# Patient Record
Sex: FEMALE | Race: WHITE | URBAN - METROPOLITAN AREA
[De-identification: names, ages, dates, MRNs, and addresses within clinical notes are randomized per-mention and may not be internally consistent; named-entity substitution may affect disease eponyms.]

---

## 2017-04-13 ENCOUNTER — HOSPITAL ENCOUNTER (EMERGENCY)
Facility: CLINIC | Age: 57
Discharge: HOME | End: 2017-04-14
Attending: EMERGENCY MEDICINE | Admitting: EMERGENCY MEDICINE
Payer: COMMERCIAL

## 2017-04-13 ENCOUNTER — APPOINTMENT (OUTPATIENT)
Dept: CT IMAGING | Facility: CLINIC | Age: 57
End: 2017-04-13
Attending: EMERGENCY MEDICINE
Payer: COMMERCIAL

## 2017-04-13 DIAGNOSIS — A04.72 C. DIFFICILE COLITIS: ICD-10-CM

## 2017-04-13 LAB
ALBUMIN SERPL-MCNC: 2.2 G/DL (ref 3.4–5)
ALP SERPL-CCNC: 99 U/L (ref 40–150)
ALT SERPL W P-5'-P-CCNC: 54 U/L (ref 0–50)
ANION GAP SERPL CALCULATED.3IONS-SCNC: 9 MMOL/L (ref 3–14)
AST SERPL W P-5'-P-CCNC: 27 U/L (ref 0–45)
BILIRUB SERPL-MCNC: 0.5 MG/DL (ref 0.2–1.3)
BUN SERPL-MCNC: 15 MG/DL (ref 7–30)
C DIFF TOX B STL QL: ABNORMAL
CALCIUM SERPL-MCNC: 8.6 MG/DL (ref 8.5–10.1)
CHLORIDE SERPL-SCNC: 101 MMOL/L (ref 94–109)
CO2 BLDCOV-SCNC: 22 MMOL/L (ref 21–28)
CO2 SERPL-SCNC: 24 MMOL/L (ref 20–32)
CREAT SERPL-MCNC: 0.6 MG/DL (ref 0.52–1.04)
ERYTHROCYTE [DISTWIDTH] IN BLOOD BY AUTOMATED COUNT: 13.2 % (ref 10–15)
GFR SERPL CREATININE-BSD FRML MDRD: ABNORMAL ML/MIN/1.7M2
GLUCOSE SERPL-MCNC: 132 MG/DL (ref 70–99)
HCT VFR BLD AUTO: 42 % (ref 35–47)
HGB BLD-MCNC: 13.6 G/DL (ref 11.7–15.7)
LACTATE BLD-SCNC: 0.9 MMOL/L (ref 0.7–2.1)
LACTATE BLD-SCNC: 0.9 MMOL/L (ref 0.7–2.1)
LIPASE SERPL-CCNC: 43 U/L (ref 73–393)
MCH RBC QN AUTO: 30.4 PG (ref 26.5–33)
MCHC RBC AUTO-ENTMCNC: 32.4 G/DL (ref 31.5–36.5)
MCV RBC AUTO: 94 FL (ref 78–100)
PCO2 BLDV: 34 MM HG (ref 40–50)
PH BLDV: 7.42 PH (ref 7.32–7.43)
PLATELET # BLD AUTO: 364 10E9/L (ref 150–450)
PO2 BLDV: 34 MM HG (ref 25–47)
POTASSIUM SERPL-SCNC: 3.3 MMOL/L (ref 3.4–5.3)
PROT SERPL-MCNC: 6 G/DL (ref 6.8–8.8)
RBC # BLD AUTO: 4.48 10E12/L (ref 3.8–5.2)
SAO2 % BLDV FROM PO2: 68 %
SODIUM SERPL-SCNC: 134 MMOL/L (ref 133–144)
SPECIMEN SOURCE: ABNORMAL
WBC # BLD AUTO: 26.1 10E9/L (ref 4–11)

## 2017-04-13 PROCEDURE — 83605 ASSAY OF LACTIC ACID: CPT | Performed by: EMERGENCY MEDICINE

## 2017-04-13 PROCEDURE — 85027 COMPLETE CBC AUTOMATED: CPT | Performed by: EMERGENCY MEDICINE

## 2017-04-13 PROCEDURE — 83605 ASSAY OF LACTIC ACID: CPT | Mod: 91

## 2017-04-13 PROCEDURE — 81001 URINALYSIS AUTO W/SCOPE: CPT | Performed by: EMERGENCY MEDICINE

## 2017-04-13 PROCEDURE — 25500064 ZZH RX 255 OP 636: Performed by: EMERGENCY MEDICINE

## 2017-04-13 PROCEDURE — 99285 EMERGENCY DEPT VISIT HI MDM: CPT | Mod: 25

## 2017-04-13 PROCEDURE — 74177 CT ABD & PELVIS W/CONTRAST: CPT

## 2017-04-13 PROCEDURE — 87506 IADNA-DNA/RNA PROBE TQ 6-11: CPT | Performed by: EMERGENCY MEDICINE

## 2017-04-13 PROCEDURE — 83690 ASSAY OF LIPASE: CPT | Performed by: EMERGENCY MEDICINE

## 2017-04-13 PROCEDURE — 87493 C DIFF AMPLIFIED PROBE: CPT | Mod: XU | Performed by: EMERGENCY MEDICINE

## 2017-04-13 PROCEDURE — 82803 BLOOD GASES ANY COMBINATION: CPT

## 2017-04-13 PROCEDURE — 25000132 ZZH RX MED GY IP 250 OP 250 PS 637: Performed by: EMERGENCY MEDICINE

## 2017-04-13 PROCEDURE — 96374 THER/PROPH/DIAG INJ IV PUSH: CPT | Mod: 59

## 2017-04-13 PROCEDURE — 80053 COMPREHEN METABOLIC PANEL: CPT | Performed by: EMERGENCY MEDICINE

## 2017-04-13 PROCEDURE — 96361 HYDRATE IV INFUSION ADD-ON: CPT

## 2017-04-13 PROCEDURE — 25000128 H RX IP 250 OP 636: Performed by: EMERGENCY MEDICINE

## 2017-04-13 RX ORDER — METRONIDAZOLE 500 MG/1
500 TABLET ORAL 3 TIMES DAILY
Qty: 42 TABLET | Refills: 0 | Status: SHIPPED | OUTPATIENT
Start: 2017-04-13 | End: 2017-04-27

## 2017-04-13 RX ORDER — ONDANSETRON 4 MG/1
4 TABLET, ORALLY DISINTEGRATING ORAL EVERY 8 HOURS PRN
Qty: 10 TABLET | Refills: 0 | Status: SHIPPED | OUTPATIENT
Start: 2017-04-13 | End: 2017-04-16

## 2017-04-13 RX ORDER — METRONIDAZOLE 500 MG/1
500 TABLET ORAL ONCE
Status: COMPLETED | OUTPATIENT
Start: 2017-04-13 | End: 2017-04-13

## 2017-04-13 RX ORDER — OXYCODONE HYDROCHLORIDE 5 MG/1
5 TABLET ORAL EVERY 6 HOURS PRN
Qty: 20 TABLET | Refills: 0 | Status: SHIPPED | OUTPATIENT
Start: 2017-04-13 | End: 2017-04-20

## 2017-04-13 RX ORDER — IOPAMIDOL 755 MG/ML
500 INJECTION, SOLUTION INTRAVASCULAR ONCE
Status: COMPLETED | OUTPATIENT
Start: 2017-04-13 | End: 2017-04-13

## 2017-04-13 RX ORDER — HYDROMORPHONE HYDROCHLORIDE 1 MG/ML
0.5 INJECTION, SOLUTION INTRAMUSCULAR; INTRAVENOUS; SUBCUTANEOUS ONCE
Status: COMPLETED | OUTPATIENT
Start: 2017-04-13 | End: 2017-04-13

## 2017-04-13 RX ADMIN — IOPAMIDOL 78 ML: 755 INJECTION, SOLUTION INTRAVENOUS at 21:55

## 2017-04-13 RX ADMIN — SODIUM CHLORIDE 1000 ML: 9 INJECTION, SOLUTION INTRAVENOUS at 20:59

## 2017-04-13 RX ADMIN — SODIUM CHLORIDE 59 ML: 9 INJECTION, SOLUTION INTRAVENOUS at 21:55

## 2017-04-13 RX ADMIN — HYDROMORPHONE HYDROCHLORIDE 0.5 MG: 1 INJECTION, SOLUTION INTRAMUSCULAR; INTRAVENOUS; SUBCUTANEOUS at 20:59

## 2017-04-13 RX ADMIN — METRONIDAZOLE 500 MG: 500 TABLET ORAL at 23:50

## 2017-04-13 ASSESSMENT — ENCOUNTER SYMPTOMS
DYSURIA: 0
DIARRHEA: 1
HEMATURIA: 0
NAUSEA: 1
FEVER: 0
VOMITING: 0
CHILLS: 1
ABDOMINAL PAIN: 1

## 2017-04-13 NOTE — ED AVS SNAPSHOT
[facility] Emergency Department    [address]    [facility] [address]    Phone:  [phone]    Fax:  [phone]                                       Alize Holman   MRN: [id number]    Department:  [facility] Emergency Department   Date of Visit:  4/13/2017           Patient Information     Date Of Birth          1960        Your diagnoses for this visit were:     C. difficile colitis        You were seen by Christal Xie MD.      Follow-up Information     Follow up with [facility] In 3 days.    Specialties:  Sports Medicine, Pain Management, Obstetrics & Gynecology, Pediatrics, Internal Medicine, Nephrology    Contact information:    [address]  [facility] [address] [phone]        Discharge Instructions       Please follow up closely with your regular physician. Please return to the ED if your symptoms worsen or if you develop new or concerning symptoms.         Clostridium difficile Infection  Clostridium difficile or C. diff bacteria can be very harmful. They affect the intestinal tract. They can cause symptoms ranging from mild diarrhea to severe inflammation of the colon (large intestine). C diff infection is most common during or days to weeks after treatment with antibiotics. Anyone can become infected. But the risk is greatly increased for people in hospitals and for people living in nursing homes or long-term care facilities. This is because antibiotic use is common there. Germs also spread easily in these places.   What causes C. diff Infection?  The stomach and intestines have hundreds of kinds of bacteria. Many of these bacteria actually help keep harmful bacteria like C. diff from causing problems. Small amounts of C. diff are normal and do not cause problems. When a person takes an antibiotic, the normal balance of good and bad bacteria may be affected. There may be too little good  bacteria and too much harmful bacteria like C diff. In hospitals and nursing homes, C. diff may be spread from an infected patient to other patients. This may occur when staff or visitors touch infected patients or objects, such as bed rails, stethoscopes, or bedpans and then touch other patients or surfaces.  What are the symptoms of C. diff infection?  About half of the people with C. diff infection have no symptoms. Yet they can still pass the infection to others. Others do have symptoms. These include:    Watery diarrhea, which may contain mucus    Pain, and cramping    Fever  Some who are infected develop serious problems. Symptoms include:    Abdominal pain    Abdominal swelling    Nausea, and vomiting    Little or no diarrhea  How is C. diff infection diagnosed?  To confirm the infection, a sample of stool is tested for the bacteria or the toxins made by the bacteria.  How is C diff infection treated?  The first step is to stop taking antibiotics. A different medication may prescribed. In certain cases, an antibiotic directed at the C diff infection may be given.    Fluids are often given intravenously (IV) or through a vein. This helps replace fluids lost through diarrhea.    Surgery may be needed if treatment fails to cure severe symptoms     To lessen symptoms:    Drink plenty of fluids to replace water lost through diarrhea. Talk with your health care provider or nurse about which fluids are best.    Follow your health care provider s instructions for when and what to eat.    Unless your health care provider tells you to do so, do not take medications for diarrhea.    Tell your health care provider if symptoms return. Even after treatment, C. difficile may come back.  What are the complications of C. diff infection?  Complications include:    Dehydration    Electrolyte imbalances    Low protein in the blood    Severe dilation or widening of the large intestine    Bowel perforation    Low blood  pressure    Kidney failure    Inflammation or infection throughout the body    Death  How is C. diff prevented?  Hospitals and nursing homes take these steps to help prevent C. diff infections:    Limiting use of antibiotics. Giving antibiotics only when needed can help reduce C. diff infections.    Handwashing. Hospital staff should wash their hands before and after treating each patient. They should also wash their hands after touching any surface in patients' rooms. Soap and water work better than alcohol-based hand .    Protective clothing. Health care workers should wear gloves and a gown when entering the room of a patient with C. diff infection. They should remove these items before leaving and then wash their hands.    Private rooms. People with C. diff should be in private rooms. Or, they may share rooms with others who have the same infection.    Thorough cleaning. Equipment and rooms should be cleaned and disinfected daily.    Education. Patients and visitors should be shown the best ways to avoid infection.  Patients can do the following to help prevent C. diff:    Take antibiotics only when you really need them. Antibiotics don t help treat illnesses caused by viruses. This includes colds and the flu. Don t ask for antibiotics from your health care provider if he or she says they won t work.    When you are given antibiotics, take them as directed. Don t increase or decrease the dosage. Do not take them for shorter or longer than your provider tells you to, even if you feel better.    Wash your hands carefully. Do this after using the bathroom and before eating. Use plenty of soap and warm water. Alcohol-based hand  may not work against C. diff germs.  Everyone can help prevent C. diff with the following:    In a hospital or care facility:    Wash your hands well before and after visiting someone who has C. diff infection. Use soap and water. Alcohol-based hand  may not work  against C. diff.    If the staff asks you to, wear gloves. Take any other steps you are asked to follow to help prevent infection.    At home:    Wear gloves when caring for a family member with C. diff infection. Throw the gloves away after each use. Then wash your hands well.    Wash the patient s clothes, bed linen, and towels separately. Use hot water. Use both detergent and liquid bleach.    Disinfect surfaces in the patient s room. This includes the phone, light switches, and remote controls.    Practice good handwashing    Use warm water and plenty of soap. Rub your hands together well.    Clean the whole hand. Wash under nails, between fingers, and up the wrists.    Wash for at least 15 seconds to 20 seconds.     Rinse. Let the water run down your fingers, not up your wrists.    Dry your hands well. Then use a paper towel to turn off the faucet and open the door.    8900-7786 [employer]. [address] [place] [address], [place] [address]. All rights reserved. This information is not intended as a substitute for professional medical care. Always follow your healthcare professional's instructions.          24 Hour Appointment Hotline       To make an appointment at any [facility], call [phone] ([phone]). If you don't have a family doctor or clinic, we will help you find one. [facility] are conveniently located to serve the needs of you and your family.             Review of your medicines      START taking        Dose / Directions Last dose taken    metroNIDAZOLE 500 MG tablet   Commonly known as:  FLAGYL   Dose:  500 mg   Quantity:  42 tablet        Take 1 tablet (500 mg) by mouth 3 times daily for 14 days   Refills:  0        ondansetron 4 MG ODT tab   Commonly known as:  ZOFRAN ODT   Dose:  4 mg   Quantity:  10 tablet        Take 1 tablet (4 mg) by mouth every 8 hours as needed   Refills:  0        oxyCODONE 5 MG IR tablet   Commonly known as:  ROXICODONE   Dose:  5 mg    Quantity:  20 tablet        Take 1 tablet (5 mg) by mouth every 6 hours as needed for pain   Refills:  0          Our records show that you are taking the medicines listed below. If these are incorrect, please call your family doctor or clinic.        Dose / Directions Last dose taken    CEPHALEXIN PO        Refills:  0        ESCITALOPRAM OXALATE PO        Refills:  0        RITALIN PO        Refills:  0                Prescriptions were sent or printed at these locations (3 Prescriptions)                   Other Prescriptions                Printed at Department/Unit printer (3 of 3)         metroNIDAZOLE (FLAGYL) 500 MG tablet               oxyCODONE (ROXICODONE) 5 MG IR tablet               ondansetron (ZOFRAN ODT) 4 MG ODT tab                Procedures and tests performed during your visit     CBC (platelets, no diff)    CT Abdomen Pelvis w Contrast    Clostridium difficile toxin B PCR    Comprehensive metabolic panel    Enteric Bacteria and Virus Panel by [facility] Stool    ISTAT gases lactate mine POCT    Lactic acid whole blood    Lipase    UA with Microscopic      Orders Needing Specimen Collection     None      Pending Results     Date and Time Order Name Status Description    4/13/2017 2020 Enteric Bacteria and Virus Panel by [facility] Stool In process             Pending Culture Results     Date and Time Order Name Status Description    4/13/2017 2020 Enteric Bacteria and Virus Panel by [facility] Stool In process             Test Results From Your Hospital Stay        4/13/2017  8:46 PM      Component Results     Component Value Ref Range & Units Status    WBC 26.1 (H) 4.0 - 11.0 10e9/L Final    RBC Count 4.48 3.8 - 5.2 10e12/L Final    Hemoglobin 13.6 11.7 - 15.7 g/dL Final    Hematocrit 42.0 35.0 - 47.0 % Final    MCV 94 78 - 100 fl Final    MCH 30.4 26.5 - 33.0 pg Final    MCHC 32.4 31.5 - 36.5 g/dL Final    RDW 13.2 10.0 - 15.0 % Final    Platelet Count 364 150 - 450 10e9/L Final         4/13/2017  9:05 PM       Component Results     Component Value Ref Range & Units Status    Sodium 134 133 - 144 mmol/L Final    Potassium 3.3 (L) 3.4 - 5.3 mmol/L Final    Chloride 101 94 - 109 mmol/L Final    Carbon Dioxide 24 20 - 32 mmol/L Final    Anion Gap 9 3 - 14 mmol/L Final    Glucose 132 (H) 70 - 99 mg/dL Final    Urea Nitrogen 15 7 - 30 mg/dL Final    Creatinine 0.60 0.52 - 1.04 mg/dL Final    GFR Estimate >90  Non  GFR Calc   >60 mL/min/1.7m2 Final    GFR Estimate If Black >90   GFR Calc   >60 mL/min/1.7m2 Final    Calcium 8.6 8.5 - 10.1 mg/dL Final    Bilirubin Total 0.5 0.2 - 1.3 mg/dL Final    Albumin 2.2 (L) 3.4 - 5.0 g/dL Final    Protein Total 6.0 (L) 6.8 - 8.8 g/dL Final    Alkaline Phosphatase 99 40 - 150 U/L Final    ALT 54 (H) 0 - 50 U/L Final    AST 27 0 - 45 U/L Final         4/13/2017  9:05 PM      Component Results     Component Value Ref Range & Units Status    Lipase 43 (L) 73 - 393 U/L Final         4/14/2017 12:13 AM      Component Results     Component Value Ref Range & Units Status    Color Urine Yellow  Final    Appearance Urine Clear  Final    Glucose Urine Negative NEG mg/dL Final    Bilirubin Urine Negative NEG Final    Ketones Urine Negative NEG mg/dL Final    Specific Gravity Urine 1.030 1.003 - 1.035 Final    Blood Urine Small (A) NEG Final    pH Urine 6.0 5.0 - 7.0 pH Final    Protein Albumin Urine Negative NEG mg/dL Final    Urobilinogen mg/dL 0.0 0.0 - 2.0 mg/dL Final    Nitrite Urine Negative NEG Final    Leukocyte Esterase Urine Negative NEG Final    Source Midstream Urine  Final    WBC Urine 2 0 - 2 /HPF Final    RBC Urine <1 0 - 2 /HPF Final    Squamous Epithelial /HPF Urine <1 0 - 1 /HPF Final         4/13/2017 10:46 PM      Component Results     Component Value Ref Range & Units Status    Specimen Description Feces  Final    C Diff Toxin B PCR  NEG Final    Positive  Positive: Toxin producing Clostridium difficile target DNA sequences detected,   presumed  positive for Clostridium difficile toxin B.   Clostridium difficile (Requires Enteric Isolation)   FDA approved assay performed using "[employer] GeneXpert real-time PCR.   Critical Value/Significant Value called to and read back by  Dr. Stanton at 2245 on [id number].[id number]   (A)         4/13/2017  8:39 PM         4/13/2017  8:42 PM      Component Results     Component Value Ref Range & Units Status    Lactic Acid 0.9 0.7 - 2.1 mmol/L Final         4/13/2017 11:10 PM      Narrative     CT ABDOMEN PELVIS WITH CONTRAST 4/13/2017 10:08 PM    HISTORY: Diffuse lower abdominal pain, diarrhea, chills and nausea.    TECHNIQUE: Helical axial scans from dome of liver through pubic  symphysis with 78 mL Isovue-370 IV contrast. Radiation dose for this  scan was reduced using automated exposure control, adjustment of the  mA and/or kV according to patient size, or iterative reconstruction  technique.    COMPARISON: None.    FINDINGS: The liver appears normal. There is a 1 cm focus of increased  density in the neck region of the gallbladder which could represent a  gallstone (image 27 of series 2). If clinically indicated, ultrasound  would be more definitive. The spleen, pancreas, bilateral adrenal  glands and kidneys bilaterally are unremarkable. There is mild diffuse  thickening and contrast-enhancement of the colonic wall throughout  most of the colon, most prominent in the descending and sigmoid colon.  This most likely represents diffuse colitis. Remainder of the bowel  and mesentery in the upper abdomen are unremarkable. Mild vascular  calcifications are seen.    Scans through the pelvis show no additional acute abnormalities. No  uterus is seen consistent with prior hysterectomy or marked uterine  atrophy. There is no free fluid. There is a retrocecal appendix with  no evidence for appendicitis.        Impression     IMPRESSION:  1. Mild diffuse thickening of the wall throughout most of the colon  consistent with colitis.  2.  Possible gallstone.  3. Vascular calcifications.       NEIL CORCORAN MD               4/13/2017  8:36 PM      Component Results     Component Value Ref Range & Units Status    Ph Venous 7.42 7.32 - 7.43 pH Final    PCO2 Venous 34 (L) 40 - 50 mm Hg Final    PO2 Venous 34 25 - 47 mm Hg Final    Bicarbonate Venous 22 21 - 28 mmol/L Final    O2 Sat Venous 68 % Final    Lactic Acid 0.9 0.7 - 2.1 mmol/L Final                Clinical Quality Measure: Blood Pressure Screening     Your blood pressure was checked while you were in the emergency department today. The last reading we obtained was  BP: 111/74 . Please read the guidelines below about what these numbers mean and what you should do about them.  If your systolic blood pressure (the top number) is less than 120 and your diastolic blood pressure (the bottom number) is less than 80, then your blood pressure is normal. There is nothing more that you need to do about it.  If your systolic blood pressure (the top number) is 120-139 or your diastolic blood pressure (the bottom number) is 80-89, your blood pressure may be higher than it should be. You should have your blood pressure rechecked within a year by a primary care provider.  If your systolic blood pressure (the top number) is 140 or greater or your diastolic blood pressure (the bottom number) is 90 or greater, you may have high blood pressure. High blood pressure is treatable, but if left untreated over time it can put you at risk for heart attack, stroke, or kidney failure. You should have your blood pressure rechecked by a primary care provider within the next 4 weeks.  If your provider in the emergency department today gave you specific instructions to follow-up with your doctor or provider even sooner than that, you should follow that instruction and not wait for up to 4 weeks for your follow-up visit.        Thank you for choosing [facility]       Thank you for choosing [facility] for your care. Our goal is  "always to provide you with excellent care. Hearing back from our patients is one way we can continue to improve our services. Please take a few minutes to complete the written survey that you may receive in the mail after you visit with us. Thank you!        [facility] Information     Differential lets you send messages to your doctor, view your test results, renew your prescriptions, schedule appointments and more. To sign up, go to [email or web address] . Click on \"Log in\" on the left side of the screen, which will take you to the Welcome page. Then click on \"Sign up Now\" on the right side of the page.     You will be asked to enter the access code listed below, as well as some personal information. Please follow the directions to create your username and password.     Your access code is: [email or web address]  Expires: 2017 12:24 AM     Your access code will  in 90 days. If you need help or a new code, please call your [facility] clinic or [phone].        Care EveryWhere ID     This is your Care EveryWhere ID. This could be used by other organizations to access your [facility] medical records  [id number]        After Visit Summary       This is your record. Keep this with you and show to your community pharmacist(s) and doctor(s) at your next visit.                  "

## 2017-04-13 NOTE — ED AVS SNAPSHOT
[facility] Emergency Department    [address]    [facility] [address]    Phone:  [phone]    Fax:  [phone]                                       Alize Holman   MRN: [id number]    Department:  [facility] Emergency Department   Date of Visit:  4/13/2017           After Visit Summary Signature Page     I have received my discharge instructions, and my questions have been answered. I have discussed any challenges I see with this plan with the nurse or doctor.    ..........................................................................................................................................  Patient/Patient Representative Signature      ..........................................................................................................................................  Patient Representative Print Name and Relationship to Patient    ..................................................               ................................................  Date                                            Time    ..........................................................................................................................................  Reviewed by Signature/Title    ...................................................              ..............................................  Date                                                            Time

## 2017-04-14 VITALS
TEMPERATURE: 98.7 F | OXYGEN SATURATION: 97 % | SYSTOLIC BLOOD PRESSURE: 111 MMHG | RESPIRATION RATE: 18 BRPM | DIASTOLIC BLOOD PRESSURE: 74 MMHG | WEIGHT: 155 LBS | HEART RATE: 133 BPM

## 2017-04-14 LAB
ALBUMIN UR-MCNC: NEGATIVE MG/DL
APPEARANCE UR: CLEAR
BILIRUB UR QL STRIP: NEGATIVE
CAMPYLOBACTER GROUP BY NAT: NOT DETECTED
COLOR UR AUTO: YELLOW
ENTERIC PATHOGEN COMMENT: NORMAL
GLUCOSE UR STRIP-MCNC: NEGATIVE MG/DL
HGB UR QL STRIP: ABNORMAL
KETONES UR STRIP-MCNC: NEGATIVE MG/DL
LEUKOCYTE ESTERASE UR QL STRIP: NEGATIVE
NITRATE UR QL: NEGATIVE
NOROVIRUS I AND II BY NAT: NOT DETECTED
PH UR STRIP: 6 PH (ref 5–7)
RBC #/AREA URNS AUTO: <1 /HPF (ref 0–2)
ROTAVIRUS A BY NAT: NOT DETECTED
SALMONELLA SPECIES BY NAT: NOT DETECTED
SHIGA TOXIN 1 GENE BY NAT: NOT DETECTED
SHIGA TOXIN 2 GENE BY NAT: NOT DETECTED
SHIGELLA SP+EIEC IPAH STL QL NAA+PROBE: NOT DETECTED
SP GR UR STRIP: 1.03 (ref 1–1.03)
SQUAMOUS #/AREA URNS AUTO: <1 /HPF (ref 0–1)
URN SPEC COLLECT METH UR: ABNORMAL
UROBILINOGEN UR STRIP-MCNC: 0 MG/DL (ref 0–2)
VIBRIO GROUP BY NAT: NOT DETECTED
WBC #/AREA URNS AUTO: 2 /HPF (ref 0–2)
YERSINIA ENTEROCOLITICA BY NAT: NOT DETECTED

## 2017-04-14 NOTE — DISCHARGE INSTRUCTIONS
Please follow up closely with your regular physician. Please return to the ED if your symptoms worsen or if you develop new or concerning symptoms.         Clostridium difficile Infection  Clostridium difficile or C. diff bacteria can be very harmful. They affect the intestinal tract. They can cause symptoms ranging from mild diarrhea to severe inflammation of the colon (large intestine). C diff infection is most common during or days to weeks after treatment with antibiotics. Anyone can become infected. But the risk is greatly increased for people in hospitals and for people living in nursing homes or long-term care facilities. This is because antibiotic use is common there. Germs also spread easily in these places.   What causes C. diff Infection?  The stomach and intestines have hundreds of kinds of bacteria. Many of these bacteria actually help keep harmful bacteria like C. diff from causing problems. Small amounts of C. diff are normal and do not cause problems. When a person takes an antibiotic, the normal balance of good and bad bacteria may be affected. There may be too little good bacteria and too much harmful bacteria like C diff. In hospitals and nursing homes, C. diff may be spread from an infected patient to other patients. This may occur when staff or visitors touch infected patients or objects, such as bed rails, stethoscopes, or bedpans and then touch other patients or surfaces.  What are the symptoms of C. diff infection?  About half of the people with C. diff infection have no symptoms. Yet they can still pass the infection to others. Others do have symptoms. These include:    Watery diarrhea, which may contain mucus    Pain, and cramping    Fever  Some who are infected develop serious problems. Symptoms include:    Abdominal pain    Abdominal swelling    Nausea, and vomiting    Little or no diarrhea  How is C. diff infection diagnosed?  To confirm the infection, a sample of stool is tested for the  bacteria or the toxins made by the bacteria.  How is C diff infection treated?  The first step is to stop taking antibiotics. A different medication may prescribed. In certain cases, an antibiotic directed at the C diff infection may be given.    Fluids are often given intravenously (IV) or through a vein. This helps replace fluids lost through diarrhea.    Surgery may be needed if treatment fails to cure severe symptoms     To lessen symptoms:    Drink plenty of fluids to replace water lost through diarrhea. Talk with your health care provider or nurse about which fluids are best.    Follow your health care provider s instructions for when and what to eat.    Unless your health care provider tells you to do so, do not take medications for diarrhea.    Tell your health care provider if symptoms return. Even after treatment, C. difficile may come back.  What are the complications of C. diff infection?  Complications include:    Dehydration    Electrolyte imbalances    Low protein in the blood    Severe dilation or widening of the large intestine    Bowel perforation    Low blood pressure    Kidney failure    Inflammation or infection throughout the body    Death  How is C. diff prevented?  Hospitals and nursing homes take these steps to help prevent C. diff infections:    Limiting use of antibiotics. Giving antibiotics only when needed can help reduce C. diff infections.    Handwashing. Hospital staff should wash their hands before and after treating each patient. They should also wash their hands after touching any surface in patients' rooms. Soap and water work better than alcohol-based hand .    Protective clothing. Health care workers should wear gloves and a gown when entering the room of a patient with C. diff infection. They should remove these items before leaving and then wash their hands.    Private rooms. People with C. diff should be in private rooms. Or, they may share rooms with others who have  the same infection.    Thorough cleaning. Equipment and rooms should be cleaned and disinfected daily.    Education. Patients and visitors should be shown the best ways to avoid infection.  Patients can do the following to help prevent C. diff:    Take antibiotics only when you really need them. Antibiotics don t help treat illnesses caused by viruses. This includes colds and the flu. Don t ask for antibiotics from your health care provider if he or she says they won t work.    When you are given antibiotics, take them as directed. Don t increase or decrease the dosage. Do not take them for shorter or longer than your provider tells you to, even if you feel better.    Wash your hands carefully. Do this after using the bathroom and before eating. Use plenty of soap and warm water. Alcohol-based hand  may not work against C. diff germs.  Everyone can help prevent C. diff with the following:    In a hospital or care facility:    Wash your hands well before and after visiting someone who has C. diff infection. Use soap and water. Alcohol-based hand  may not work against C. diff.    If the staff asks you to, wear gloves. Take any other steps you are asked to follow to help prevent infection.    At home:    Wear gloves when caring for a family member with C. diff infection. Throw the gloves away after each use. Then wash your hands well.    Wash the patient s clothes, bed linen, and towels separately. Use hot water. Use both detergent and liquid bleach.    Disinfect surfaces in the patient s room. This includes the phone, light switches, and remote controls.    Practice good handwashing    Use warm water and plenty of soap. Rub your hands together well.    Clean the whole hand. Wash under nails, between fingers, and up the wrists.    Wash for at least 15 seconds to 20 seconds.     Rinse. Let the water run down your fingers, not up your wrists.    Dry your hands well. Then use a paper towel to turn off the  faucet and open the door.    1400-8760 The "[employer]". [address] [place] [address], [place] [address]. All rights reserved. This information is not intended as a substitute for professional medical care. Always follow your healthcare professional's instructions.

## 2017-04-14 NOTE — ED PROVIDER NOTES
History     Chief Complaint:  Diarrhea    The history is provided by the patient.      Alize Holman is a 56 year old female status recent oral surgery on 4/7 who presents to the ED for evaluation of diarrhea. The patient states that she was initially found to have an oral infection back in February. At that time, she was placed on oral antibiotics for 2 weeks. Two weeks after starting these antibiotics, she began to experience diarrhea. She subsequently stopped taking the antibiotics, but the symptoms did not stop. After her surgery last week, the patient was again placed on antibiotics. She presents today with crampy abdominal pain with associated diffuse, watery diarrhea. She says that she has had 12 episodes of diarrhea today. She denies the presence of blood. In addition, she admits to intermittent chills and nausea. She denies fevers, vomiting, dysuria or hematuria. She denies any recent travel. No other concerns or complaints at this time.     Allergies:  Penicillins     Medications:    Ritalin  Escitalopram  Keflex     Past Medical History:    ADD  Depression    Past Surgical History:    History reviewed. No pertinent past surgical history.    Family History:    History reviewed. No pertinent family history.    Social History:  Marital Status:   Presents to the ED with her daughter     Review of Systems   Constitutional: Positive for chills. Negative for fever.   Gastrointestinal: Positive for abdominal pain, diarrhea and nausea. Negative for vomiting.   Genitourinary: Negative for dysuria and hematuria.   All other systems reviewed and are negative.      Physical Exam   First Vitals:  BP: 105/77  Pulse: 133  Heart Rate: 133  Temp: 98.7  F (37.1  C)  Resp: 18  Weight: 70.3 kg (155 lb)  SpO2: 96 %      Physical Exam  Constitutional: The patient is oriented to person, place, and time. Alert and cooperative.  HENT:   Right Ear: External ear normal.   Left Ear: External ear normal.   Nose: Nose normal.    Mouth/Throat: Uvula is midline, oropharynx is clear and moist and mucous membranes are normal. No posterior oropharyngeal edema or erythema.   Eyes: Conjunctivae, EOM and lids are normal. Pupils are equal, round, and reactive to light.   Neck: Trachea normal. Normal range of motion. Neck supple.   Cardiovascular: tachycardia, regular rhythm, normal heart sounds, and intact distal pulses.    Pulmonary/Chest: Effort normal and breath sounds equal bilaterally. No crackles or wheezing.   Abdominal: Soft. Diffuse lower abdominal tenderness to palpation. No rebound and no guarding.   Musculoskeletal: Normal range of motion.  No extremity tenderness or edema.  Neurological: Alert and Oriented. Strength 5/5 in upper and lower extremities bilaterally. Sensation intact to light touch throughout.  Skin: Skin is dry. No rash noted.          Emergency Department Course     Imaging:    CT Abdomen/pelvis w/Contrast  1. Mild diffuse thickening of the wall throughout most of the colon  consistent with colitis.  2. Possible gallstone.  3. Vascular calcifications.  Report per radiology.    Radiographic findings were communicated with the patient and family who voiced understanding of the findings.    Laboratory:  CBC:  WBC 26.1 (H), HGB 13.6, , otherwise WNL  CMP: K 3.3 (L), Glucose 1332 (H), Albumin 2.2 (L), Total protein 6.0 (L), ALT 54 (H), otherwise WNL (Creatinine 0.60)  Lipase: 43 (L)    (2023) Lactic acid: 0.9    (2032) Blood gas venous POCT: pH 7.42 PCO2 34 (L) PO2 34 Bicarbonate 22 O2 Sat 68 Lactic acid 0.9    UA: Clear yellow urine, small blood, otherwise WNL    C. Diff toxin B PCR: positive  Enteric bacteria & virus panel: pending    Interventions:  (2059) Normal Saline, 1 liter, IV bolus  (2059) Dilaudid, 0.5 mg, IV  (2350) Flagyl, 500 mg, PO    Emergency Department Course:  Nursing notes and vitals reviewed.  I performed an exam of the patient as documented above. GCS 15.    A peripheral IV was established. Blood  was drawn from the patient. This was sent for laboratory testing, findings above. Urine and stool samples were obtained and sent for laboratory analysis, findings above.    The patient was sent for a CT abdomen/pelvis while in the emergency department, findings above.     (2230) I updated the patient on all of the lab and imaging results.    Findings and plan explained to the patient. Patient discharged home with instructions regarding supportive care, medications, and reasons to return. The importance of close follow-up was reviewed. The patient was prescribed flagyl, roxicodone and zofran.    I personally reviewed the laboratory results with the patient and answered all related questions prior to discharge.       Impression & Plan      Medical Decision Making:  Alize Holman is a 56 year old female who presents to the ED for evaluation of diarrhea. Upon presentation to the ED, the patient is nontoxic appearing. She is tachycardic, though her vital signs are otherwise within normal limits and stable. Throughout her ED stay, her heart rate improved. On exam, she is well appearing. She is alert, oriented, and neuro exam is nonfocal. Aside from tachycardia, cardiopulmonary exam is unremarkable. On abdominal examination, she has diffuse lower tenderness with palpation. There is no rebound or guarding. The rest of her exam is as mentioned above. Labs were obtained and are as mentioned above. Notably, she does have a leukocytosis of 26.1. Lactate is within normal limits. CT of the abdomen was obtained and demonstrates mild diffuse thickening of the wall throughout most of the colon consistent with colitis. A C. Difficile and stool cultures were obtained. The C. Difficile did return positive. Given this patient's history and presentation, I do feel that the patient's symptoms are most consistent with a C. Difficile colitis. The patient is well-appearing here, therefore I do feel that she can be discharged to home with  outpatient management of her C. Difficile. I did discuss with the patient that I recommend close follow-up with her PCP and she notes understanding and agreement with this plan. Return instructions were given. She was stable/improved at time of discharge.       Diagnosis:    ICD-10-CM    1. C. difficile colitis A04.7        Disposition:  discharged to home    Discharge Medications:  New Prescriptions    METRONIDAZOLE (FLAGYL) 500 MG TABLET    Take 1 tablet (500 mg) by mouth 3 times daily for 14 days    ONDANSETRON (ZOFRAN ODT) 4 MG ODT TAB    Take 1 tablet (4 mg) by mouth every 8 hours as needed    OXYCODONE (ROXICODONE) 5 MG IR TABLET    Take 1 tablet (5 mg) by mouth every 6 hours as needed for pain       I, Bacilio Paz, am serving as a scribe on 4/13/2017 at 8:03 PM to personally document services performed by Dr. Anne-Marie MD based on my observations and the provider's statements to me.     4/13/2017   [facility] EMERGENCY DEPARTMENT       Christal Xie MD  04/14/17 5799

## 2017-04-20 ENCOUNTER — OFFICE VISIT (OUTPATIENT)
Dept: FAMILY MEDICINE | Facility: CLINIC | Age: 57
End: 2017-04-20
Payer: COMMERCIAL

## 2017-04-20 VITALS
HEIGHT: 67 IN | WEIGHT: 147 LBS | TEMPERATURE: 98.1 F | BODY MASS INDEX: 23.07 KG/M2 | DIASTOLIC BLOOD PRESSURE: 86 MMHG | HEART RATE: 105 BPM | SYSTOLIC BLOOD PRESSURE: 116 MMHG

## 2017-04-20 DIAGNOSIS — Z12.11 SCREEN FOR COLON CANCER: ICD-10-CM

## 2017-04-20 DIAGNOSIS — A04.72 COLITIS DUE TO CLOSTRIDIUM DIFFICILE: Primary | ICD-10-CM

## 2017-04-20 DIAGNOSIS — Z71.89 ADVANCED DIRECTIVES, COUNSELING/DISCUSSION: ICD-10-CM

## 2017-04-20 DIAGNOSIS — F17.200 TOBACCO USE DISORDER: ICD-10-CM

## 2017-04-20 PROBLEM — F90.9 ADULT ADHD: Status: ACTIVE | Noted: 2017-04-20

## 2017-04-20 PROBLEM — F41.9 ANXIETY: Status: ACTIVE | Noted: 2017-04-20

## 2017-04-20 PROCEDURE — 99203 OFFICE O/P NEW LOW 30 MIN: CPT | Performed by: FAMILY MEDICINE

## 2017-04-20 NOTE — NURSING NOTE
"Chief Complaint   Patient presents with     ER F/U       Initial /86 (BP Location: Right arm, Patient Position: Chair, Cuff Size: Adult Regular)  Pulse 105  Temp 98.1  F (36.7  C) (Oral)  Ht 5' 7.25\" (1.708 m)  Wt 147 lb (66.7 kg)  Breastfeeding? No  BMI 22.85 kg/m2 Estimated body mass index is 22.85 kg/(m^2) as calculated from the following:    Height as of this encounter: 5' 7.25\" (1.708 m).    Weight as of this encounter: 147 lb (66.7 kg)..    Health maintenance- rodrick out to [facility] Sethi CMA          "

## 2017-04-20 NOTE — Clinical Note
Please abstract the following data from this visit with this patient into the appropriate field in Epic:   Mammogram done on this date: 7- (approximately), by this group: [facility], results were normal

## 2017-04-20 NOTE — PROGRESS NOTES
"  SUBJECTIVE:                                                    Alize Holman is a 56 year old female who presents to clinic today for the following health issues:      ED/UC Followup:    Facility:  [facility]  Date of visit: 4-13-17  Reason for visit: diarrhea  Current Status: weak, dizzy. Diarrhea better. Right hand swelling this morning. Hand stiffness.     Please abstract the following data from this visit with this patient into the appropriate field in Epic:    Mammogram done on this date: 7- (approximately), by this group: normal, results were [facility].     {additional problems for provider to add:636000}    Problem list and histories reviewed & adjusted, as indicated.  Additional history: {NONE - AS DOCUMENTED:098056::\"as documented\"}    {HIST REVIEW/ LINKS 2:993873}    Reviewed and updated as needed this visit by clinical staff       Reviewed and updated as needed this visit by Provider         {PROVIDER CHARTING PREFERENCE:637461}    "

## 2017-04-20 NOTE — PATIENT INSTRUCTIONS
Start probiotic daily for the next 2 weeks    Continue antibiotics until complete    Call with new symptoms of cramping or new diarrhea    Elevate right hand, ibuprofen, ice as needed

## 2017-04-20 NOTE — MR AVS SNAPSHOT
After Visit Summary   4/20/2017    Alize Holman    MRN: [id number]           Patient Information     Date Of Birth          1960        Visit Information        Provider Department      4/20/2017 2:30 PM Sherrie Zimmerman MD [facility]        Today's Diagnoses     Screen for colon cancer    -  1    Advanced directives, counseling/discussion          Care Instructions    Start probiotic daily for the next 2 weeks    Continue antibiotics until complete    Call with new symptoms of cramping or new diarrhea    Elevate right hand, ibuprofen, ice as needed        Follow-ups after your visit        Additional Services     GASTROENTEROLOGY ADULT REF PROCEDURE ONLY       Last Lab Result: Creatinine (mg/dL)       Date                     Value                 04/13/2017               0.60             ----------  Body mass index is 22.85 kg/(m^2).     Needed:  No  Language:  English    Patient will be contacted to schedule procedure.     Please be aware that coverage of these services is subject to the terms and limitations of your health insurance plan.  Call member services at your health plan with any benefit or coverage questions.  Any procedures must be performed at a [facility] facility OR coordinated by your clinic's referral office.    Please bring the following with you to your appointment:    (1) Any X-Rays, CTs or MRIs which have been performed.  Contact the facility where they were done to arrange for  prior to your scheduled appointment.    (2) List of current medications   (3) This referral request   (4) Any documents/labs given to you for this referral                  Who to contact     If you have questions or need follow up information about today's clinic visit or your schedule please contact [facility] directly at [phone].  Normal or non-critical lab and imaging results will be communicated to you by MyChart, letter or phone  "within 4 business days after the clinic has received the results. If you do not hear from us within 7 days, please contact the clinic through [employer] or phone. If you have a critical or abnormal lab result, we will notify you by phone as soon as possible.  Submit refill requests through [employer] or call your pharmacy and they will forward the refill request to us. Please allow 3 business days for your refill to be completed.          Additional Information About Your Visit        Organizer Information     Organizer lets you send messages to your doctor, view your test results, renew your prescriptions, schedule appointments and more. To sign up, go to [email or web address] . Click on \"Log in\" on the left side of the screen, which will take you to the Welcome page. Then click on \"Sign up Now\" on the right side of the page.     You will be asked to enter the access code listed below, as well as some personal information. Please follow the directions to create your username and password.     Your access code is: [email or web address]  Expires: 2017 12:24 AM     Your access code will  in 90 days. If you need help or a new code, please call your [place] clinic or [phone].        Care EveryWhere ID     This is your Care EveryWhere ID. This could be used by other organizations to access your [place] medical records  [id number]        Your Vitals Were     Pulse Temperature Height Breastfeeding? BMI (Body Mass Index)       105 98.1  F (36.7  C) (Oral) 5' 7.25\" (1.708 m) No 22.85 kg/m2        Blood Pressure from Last 3 Encounters:   17 116/86   17 111/74    Weight from Last 3 Encounters:   17 147 lb (66.7 kg)   17 155 lb (70.3 kg)              We Performed the Following     GASTROENTEROLOGY ADULT REF PROCEDURE ONLY          Today's Medication Changes          These changes are accurate as of: 17  3:11 PM.  If you have any questions, ask your nurse or doctor.               Stop taking these " medicines if you haven't already. Please contact your care team if you have questions.     CEPHALEXIN PO   Stopped by:  Sherrie Zimmerman MD           oxyCODONE 5 MG IR tablet   Commonly known as:  ROXICODONE   Stopped by:  Sherrie Zimmerman MD                    Primary Care Provider Office Phone # Fax #    Sherrie Zimmerman [phone]       [facility] [address]  [facility] [address]        Thank you!     Thank you for choosing [facility]  for your care. Our goal is always to provide you with excellent care. Hearing back from our patients is one way we can continue to improve our services. Please take a few minutes to complete the written survey that you may receive in the mail after your visit with us. Thank you!             Your Updated Medication List - Protect others around you: Learn how to safely use, store and throw away your medicines at www.disposemymeds.org.          This list is accurate as of: 4/20/17  3:11 PM.  Always use your most recent med list.                   Brand Name Dispense Instructions for use    ESCITALOPRAM OXALATE PO          metroNIDAZOLE 500 MG tablet    FLAGYL    42 tablet    Take 1 tablet (500 mg) by mouth 3 times daily for 14 days       RITALIN PO

## 2017-04-21 NOTE — PROGRESS NOTES
SUBJECTIVE:                                                    Alize Holman is a 56 year old female who presents to clinic today for the following health issues:      ED/UC Followup:    Facility:  [facility]  Date of visit: 4-13-17  Reason for visit: diarrhea  Current Status: weak, dizzy. Diarrhea better. Right hand swelling this morning. Hand stiffness.       Treated for C. Diff colitis with 14 days metronidazole. About half way complete with treatment, reports diarrhea and abdominal cramping has almost fully resolved. She has a good appetite but has been restricting herself as her stools  a bit when she overeats. Has been eating bland foods.     She has concerns that metronidazole will not be enough to cure her infection. Daughter was resistant to metronidazole when she got c. Diff infections.     Wonders if she should be taking a probiotic.     Has not been drinking a lot of fluids.     No fevers.     Woke this AM with swelling and pain in her right hand. Took NSAIDs and iced, has improved since then.     Would like to switch to [facility] as she feels it is a strong healthcare system.     Hx of adult onset ADHD, anxiety. Takes Ritalin and lexapro. Both working well for her, although notes she is an anxious person.       Please abstract the following data from this visit with this patient into the appropriate field in Epic:    Mammogram done on this date: 7- (approximately), by this group: normal, results were [facility]ina.       Problem list and histories reviewed & adjusted, as indicated.  Additional history: none    Patient Active Problem List   Diagnosis     Knee pain     Advanced directives, counseling/discussion     History reviewed. No pertinent surgical history.    Social History   Substance Use Topics     Smoking status: Current Every Day Smoker     Smokeless tobacco: Not on file     Alcohol use Yes      Comment: occ     Family History   Problem Relation Age of Onset     Hypertension Mother      Breast  "Cancer Sister            Reviewed and updated as needed this visit by clinical staff       Reviewed and updated as needed this visit by Provider         ROS:  Constitutional, HEENT, cardiovascular, pulmonary, gi and gu systems are negative, except as otherwise noted.    OBJECTIVE:                                                    /86 (BP Location: Right arm, Patient Position: Chair, Cuff Size: Adult Regular)  Pulse 105  Temp 98.1  F (36.7  C) (Oral)  Ht 5' 7.25\" (1.708 m)  Wt 147 lb (66.7 kg)  Breastfeeding? No  BMI 22.85 kg/m2  Body mass index is 22.85 kg/(m^2).  GENERAL: healthy, alert and no distress  RESP: lungs clear to auscultation - no rales, rhonchi or wheezes  CV: regular rate and rhythm, normal S1 S2, no S3 or S4, no murmur, click or rub, no peripheral edema and peripheral pulses strong  ABDOMEN: soft, nontender, no hepatosplenomegaly, no masses and bowel sounds normal    Diagnostic Test Results:  Results for orders placed or performed during the hospital encounter of 04/13/17   CT Abdomen Pelvis w Contrast    Narrative    CT ABDOMEN PELVIS WITH CONTRAST 4/13/2017 10:08 PM    HISTORY: Diffuse lower abdominal pain, diarrhea, chills and nausea.    TECHNIQUE: Helical axial scans from dome of liver through pubic  symphysis with 78 mL Isovue-370 IV contrast. Radiation dose for this  scan was reduced using automated exposure control, adjustment of the  mA and/or kV according to patient size, or iterative reconstruction  technique.    COMPARISON: None.    FINDINGS: The liver appears normal. There is a 1 cm focus of increased  density in the neck region of the gallbladder which could represent a  gallstone (image 27 of series 2). If clinically indicated, ultrasound  would be more definitive. The spleen, pancreas, bilateral adrenal  glands and kidneys bilaterally are unremarkable. There is mild diffuse  thickening and contrast-enhancement of the colonic wall throughout  most of the colon, most " prominent in the descending and sigmoid colon.  This most likely represents diffuse colitis. Remainder of the bowel  and mesentery in the upper abdomen are unremarkable. Mild vascular  calcifications are seen.    Scans through the pelvis show no additional acute abnormalities. No  uterus is seen consistent with prior hysterectomy or marked uterine  atrophy. There is no free fluid. There is a retrocecal appendix with  no evidence for appendicitis.      Impression    IMPRESSION:  1. Mild diffuse thickening of the wall throughout most of the colon  consistent with colitis.  2. Possible gallstone.  3. Vascular calcifications.       NEIL CORCORAN MD   CBC (platelets, no diff)   Result Value Ref Range    WBC 26.1 (H) 4.0 - 11.0 10e9/L    RBC Count 4.48 3.8 - 5.2 10e12/L    Hemoglobin 13.6 11.7 - 15.7 g/dL    Hematocrit 42.0 35.0 - 47.0 %    MCV 94 78 - 100 fl    MCH 30.4 26.5 - 33.0 pg    MCHC 32.4 31.5 - 36.5 g/dL    RDW 13.2 10.0 - 15.0 %    Platelet Count 364 150 - 450 10e9/L   Comprehensive metabolic panel   Result Value Ref Range    Sodium 134 133 - 144 mmol/L    Potassium 3.3 (L) 3.4 - 5.3 mmol/L    Chloride 101 94 - 109 mmol/L    Carbon Dioxide 24 20 - 32 mmol/L    Anion Gap 9 3 - 14 mmol/L    Glucose 132 (H) 70 - 99 mg/dL    Urea Nitrogen 15 7 - 30 mg/dL    Creatinine 0.60 0.52 - 1.04 mg/dL    GFR Estimate >90  Non  GFR Calc   >60 mL/min/1.7m2    GFR Estimate If Black >90   GFR Calc   >60 mL/min/1.7m2    Calcium 8.6 8.5 - 10.1 mg/dL    Bilirubin Total 0.5 0.2 - 1.3 mg/dL    Albumin 2.2 (L) 3.4 - 5.0 g/dL    Protein Total 6.0 (L) 6.8 - 8.8 g/dL    Alkaline Phosphatase 99 40 - 150 U/L    ALT 54 (H) 0 - 50 U/L    AST 27 0 - 45 U/L   Lipase   Result Value Ref Range    Lipase 43 (L) 73 - 393 U/L   UA with Microscopic   Result Value Ref Range    Color Urine Yellow     Appearance Urine Clear     Glucose Urine Negative NEG mg/dL    Bilirubin Urine Negative NEG    Ketones Urine Negative  NEG mg/dL    Specific Gravity Urine 1.030 1.003 - 1.035    Blood Urine Small (A) NEG    pH Urine 6.0 5.0 - 7.0 pH    Protein Albumin Urine Negative NEG mg/dL    Urobilinogen mg/dL 0.0 0.0 - 2.0 mg/dL    Nitrite Urine Negative NEG    Leukocyte Esterase Urine Negative NEG    Source Midstream Urine     WBC Urine 2 0 - 2 /HPF    RBC Urine <1 0 - 2 /HPF    Squamous Epithelial /HPF Urine <1 0 - 1 /HPF   Lactic acid whole blood   Result Value Ref Range    Lactic Acid 0.9 0.7 - 2.1 mmol/L   ISTAT gases lactate mine POCT   Result Value Ref Range    Ph Venous 7.42 7.32 - 7.43 pH    PCO2 Venous 34 (L) 40 - 50 mm Hg    PO2 Venous 34 25 - 47 mm Hg    Bicarbonate Venous 22 21 - 28 mmol/L    O2 Sat Venous 68 %    Lactic Acid 0.9 0.7 - 2.1 mmol/L   Clostridium difficile toxin B PCR   Result Value Ref Range    Specimen Description Feces     C Diff Toxin B PCR (A) NEG     Positive  Positive: Toxin producing Clostridium difficile target DNA sequences detected,   presumed positive for Clostridium difficile toxin B.   Clostridium difficile (Requires Enteric Isolation)   FDA approved assay performed using [employer] GeneXpert real-time PCR.   Critical Value/Significant Value called to and read back by  Dr. Stanton at 2245 on [id number]     Enteric Bacteria and Virus Panel by JEREMIAS Stool   Result Value Ref Range    Campylobacter group by JEREMIAS Not Detected NDET    Salmonella species by JEREMIAS Not Detected NDET    Shigella species by JEREMIAS Not Detected NDET    Vibrio group by JEREMISA Not Detected NDET    Rotavirus A by JEREMIAS Not Detected NDET    Shiga toxin 1 gene by JEREMIAS Not Detected NDET    Shiga toxin 2 gene by JEREMIAS Not Detected NDET    Norovirus I and II by JEREMIAS Not Detected NDET    Yersinia enterocolitica by JEREMIAS Not Detected NDET    Enteric pathogen comment       Testing performed by multiplexed, qualitative PCR using the [employer]   Enteric Pathogens Nucleic Acid Test. Results should not be used as the sole   basis for diagnosis, treatment, or  other patient management decisions.   Positive results do not rule out co-infection with other organisms that are not   detected by this test, and may not be the sole or definitive cause of patient   illness.   Negative results in the setting of clinical illness compatible with   gastroenteritis may be due to infection by pathogens that are not detected by   this test or non-infectious causes such as ulcerative colitis, irritable bowel   syndrome, or Crohn's disease.   Note: Shiga toxin producing E. coli (STEC) typically harbor one or both genes   that encode for Shiga toxins 1 and 2.          ASSESSMENT/PLAN:                                                      1. Colitis due to Clostridium difficile - suggested continue metronidazole as it seems to be working well for her, monitor for joint pain as this can be a side effect. Keep up with bland food, drink water, and take probiotic.     2. Screen for colon cancer  - GASTROENTEROLOGY ADULT REF PROCEDURE ONLY    3. Advanced directives, counseling/discussion        Sherrie Zimmerman MD  [facility]

## 2017-05-05 ENCOUNTER — TELEPHONE (OUTPATIENT)
Dept: GASTROENTEROLOGY | Facility: CLINIC | Age: 57
End: 2017-05-05

## 2017-05-05 NOTE — TELEPHONE ENCOUNTER
Third attempt to reach patient to schedule Colonoscopy. Not Scheduled at [facility]. Left Messages.

## 2017-06-03 ENCOUNTER — HEALTH MAINTENANCE LETTER (OUTPATIENT)
Age: 57
End: 2017-06-03

## 2017-07-11 RX ORDER — TROSPIUM CHLORIDE 20 MG/1
20 TABLET, FILM COATED ORAL
COMMUNITY
Start: 2016-03-31

## 2017-07-11 RX ORDER — METHYLPHENIDATE HYDROCHLORIDE 20 MG/1
40 TABLET ORAL
COMMUNITY
Start: 2017-01-23

## 2017-07-11 RX ORDER — IBUPROFEN 200 MG
200 TABLET ORAL
COMMUNITY
Start: 2011-04-11

## 2017-07-11 RX ORDER — FLUTICASONE PROPIONATE 50 MCG
SPRAY, SUSPENSION (ML) NASAL
COMMUNITY
Start: 2015-03-02

## 2017-07-11 RX ORDER — ESCITALOPRAM OXALATE 20 MG/1
20 TABLET ORAL
COMMUNITY
Start: 2016-10-18

## 2017-07-17 ENCOUNTER — HOSPITAL ENCOUNTER (OUTPATIENT)
Facility: CLINIC | Age: 57
Discharge: HOME | End: 2017-07-17
Attending: INTERNAL MEDICINE | Admitting: INTERNAL MEDICINE
Payer: COMMERCIAL

## 2017-07-17 VITALS
OXYGEN SATURATION: 94 % | HEIGHT: 67 IN | RESPIRATION RATE: 16 BRPM | WEIGHT: 150 LBS | BODY MASS INDEX: 23.54 KG/M2 | SYSTOLIC BLOOD PRESSURE: 126 MMHG | DIASTOLIC BLOOD PRESSURE: 92 MMHG | HEART RATE: 85 BPM

## 2017-07-17 LAB — COLONOSCOPY: NORMAL

## 2017-07-17 PROCEDURE — 45385 COLONOSCOPY W/LESION REMOVAL: CPT | Performed by: INTERNAL MEDICINE

## 2017-07-17 PROCEDURE — 88305 TISSUE EXAM BY PATHOLOGIST: CPT | Mod: 26 | Performed by: INTERNAL MEDICINE

## 2017-07-17 PROCEDURE — 25000128 H RX IP 250 OP 636: Performed by: INTERNAL MEDICINE

## 2017-07-17 PROCEDURE — 88305 TISSUE EXAM BY PATHOLOGIST: CPT | Performed by: INTERNAL MEDICINE

## 2017-07-17 PROCEDURE — G0500 MOD SEDAT ENDO SERVICE >5YRS: HCPCS | Performed by: INTERNAL MEDICINE

## 2017-07-17 RX ORDER — ONDANSETRON 2 MG/ML
4 INJECTION INTRAMUSCULAR; INTRAVENOUS
Status: DISCONTINUED | OUTPATIENT
Start: 2017-07-17 | End: 2017-07-17 | Stop reason: HOSPADM

## 2017-07-17 RX ORDER — ONDANSETRON 2 MG/ML
4 INJECTION INTRAMUSCULAR; INTRAVENOUS EVERY 6 HOURS PRN
Status: DISCONTINUED | OUTPATIENT
Start: 2017-07-17 | End: 2017-07-17 | Stop reason: HOSPADM

## 2017-07-17 RX ORDER — NALOXONE HYDROCHLORIDE 0.4 MG/ML
.1-.4 INJECTION, SOLUTION INTRAMUSCULAR; INTRAVENOUS; SUBCUTANEOUS
Status: DISCONTINUED | OUTPATIENT
Start: 2017-07-17 | End: 2017-07-17 | Stop reason: HOSPADM

## 2017-07-17 RX ORDER — FLUMAZENIL 0.1 MG/ML
0.2 INJECTION, SOLUTION INTRAVENOUS
Status: DISCONTINUED | OUTPATIENT
Start: 2017-07-17 | End: 2017-07-17 | Stop reason: HOSPADM

## 2017-07-17 RX ORDER — LIDOCAINE 40 MG/G
CREAM TOPICAL
Status: DISCONTINUED | OUTPATIENT
Start: 2017-07-17 | End: 2017-07-17 | Stop reason: HOSPADM

## 2017-07-17 RX ORDER — FENTANYL CITRATE 50 UG/ML
INJECTION, SOLUTION INTRAMUSCULAR; INTRAVENOUS PRN
Status: DISCONTINUED | OUTPATIENT
Start: 2017-07-17 | End: 2017-07-17 | Stop reason: HOSPADM

## 2017-07-17 RX ORDER — ONDANSETRON 4 MG/1
4 TABLET, ORALLY DISINTEGRATING ORAL EVERY 6 HOURS PRN
Status: DISCONTINUED | OUTPATIENT
Start: 2017-07-17 | End: 2017-07-17 | Stop reason: HOSPADM

## 2017-07-17 NOTE — LETTER
July 5, 2017      Alize Holman  [address]  [facility] [id number]              Dear Alize Holman,     Thank you for choosing [facility]. You are scheduled for the following service.     Date:  Monday July 17, 2017             Procedure:  COLONOSCOPY  Doctor:        Dr Hightower   Arrival Time:  0900  *check in at Emergency/Endoscopy desk*  Procedure Time:  0930    Location:   [facility]        Endoscopy Department, First Floor (Enter through ER Doors) *        [address] [place], [place] [address]      [phone] or [phone] () to reschedule        Colonoscopy is the most accurate test to detect colon polyps and colon cancer; and the only test where polyps can be removed. During this procedure, a doctor examines the lining of your large intestine and rectum through a flexible tube.           Transportation  Arrange for a ride for the day of your procedure with a responsible adult.  A taxi ride is not an option unless you are accompanied by a responsible adult. If you fail to arrange transportation with a responsible adult, your procedure will be cancelled and rescheduled.    MIRALAX -GATORADE  PREP    Purchase the following supplies at your local pharmacy:  - 2 (two) bisacodyl tablets: each tablet contains 5 mg.  (Dulcolax  laxative NOT Dulcolax  stool softener)   - 1 (one) 8.3 oz bottle of Polyethylene Glycol (PEG) 3350 Powder   (MiraLAX , Smooth LAX , ClearLAX  or equivalent)  - 64 oz Gatorade    Regular Gatorade, Gatorade G2 , Powerade , Powerade Zero  or Pedialyte  is acceptable. Red colored flavors are not allowed; all other colors (yellow, green, orange, purple and blue) are okay. It is also okay to buy two 2.12 oz packets of powdered Gatorade that can be mixed with water to a total volume of 64 oz of liquid.  - 1 (one) 10 oz bottle of Magnesium Citrate (Red colored flavors are not allowed)  It is also okay for you to use a 0.5  oz package of powdered magnesium citrate (17 g) mixed with 10 oz of water.      PREPARATION FOR COLONOSCOPY    7 days before:    Discontinue fiber supplements and medications containing iron. This includes Metamucil  and Fibercon ; and multivitamins with iron.  3 days before:    Begin a low-fiber diet. A low-fiber diet helps making the cleanout more effective.     Examples of a low-fiber diet include (but are not limited to): white bread, white rice, pasta, crackers, fish, chicken, eggs, ground beef, creamy peanut butter, cooked/steamed/boiled vegetables, canned fruit, bananas, melons, milk, plain yogurt cheese, salad dressing and other condiments.     The following are not allowed on a low-fiber diet: seeds, nuts, popcorn, bran, whole wheat, corn, quinoa, raw fruits and vegetables, berries and dried fruit, beans and lentils.    For additional details on low-fiber diet, please refer to the table on the last page.  2 days before:    Continue the low-fiber diet.     Drink at least 8 glasses of water throughout the day.     Stop eating solid foods at 11:45 pm.  1 day before:    In the morning: begin a clear liquid diet (liquids you can see through).     Examples of a clear liquid diet include: water, clear broth or bouillon, Gatorade, Pedialyte or Powerade, carbonated and non-carbonated soft drinks (Sprite , 7-Up , ginger ale), strained fruit juices without pulp (apple, white grape, white cranberry), Jell-O  and popsicles.     The following are not allowed on a clear liquid diet: red liquids, alcoholic beverages, coffee, dairy products (milk, creamer, and yogurt), protein shakes, creamy broths, juice with pulp and chewing tobacco.    At noon: take 2 (two) bisacodyl tablets     At 4 (and no later than 6pm): start drinking the Miralax-Gatorade preparation (8.3 oz of Miralax mixed with 64 oz of Gatorade in a large pitcher). Drink 1(one) 8 oz glass every 15 minutes thereafter, until the mixture is gone.    COLON CLEANSING  TIPS: drink adequate amounts of fluids before and after your colon cleansing to prevent dehydration. Stay near a toilet because you will have diarrhea. Even if you are sitting on the toilet, continue to drink the cleansing solution every 15 minutes. If you feel nauseous or vomit, rinse your mouth with water, take a 15 to 30-minute-break and then continue drinking the solution. You will be uncomfortable until the stool has flushed from your colon (in about 2 to 4 hours). You may feel chilled.    Day of your procedure  You may take all of your morning medications including blood pressure medications, blood thinners (if you have not been instructed to stop these by our office), methadone, anti-seizure medications with sips of water 3 hours prior to your procedure or earlier. Do not take insulin or vitamins prior to your procedure. Continue the clear liquid diet.   4 hours prior: drink 10 oz of magnesium citrate. It may be easier to drink it with a straw.    STOP consuming all liquids after that.     Do not take anything by mouth during this time.     Allow extra time to travel to your procedure as you may need to stop and use a restroom along the way.  You are ready for the procedure, if you followed all instructions and your stool is no longer formed, but clear or yellow liquid. If you are unsure whether your colon is clean, please call our office at [phone] before you leave for your appointment.  Bring the following to your procedure:  - Insurance Card/Photo ID.   - List of current medications including over-the-counter medications and supplements.   - Your rescue inhaler if you currently use one to control asthma.      Canceling or rescheduling your appointment:   If you must cancel or reschedule your appointment, please call [phone] as soon as possible.      COLONOSCOPY PRE-PROCEDURE CHECKLIST  If you have diabetes, ask your regular doctor for diet and medication restrictions.  If you take an  anticoagulant or anti-platelet medication (such as Coumadin , Lovenox , Pradaxa , Xarelto , Eliquis , etc.), please call your primary doctor for advice on holding this medication.  If you take aspirin you may continue to do so.  If you are or may be pregnant, please discuss the risks and benefits of this procedure with your doctor.          What happens during a colonoscopy?    Plan to spend up to two hours, starting at registration time, at the endoscopy center the day of your procedure. The colonoscopy takes an average of 15 to 30 minutes. Recovery time is about 30 minutes.    Before the exam:    You will change into a gown.    Your medical history and medication list will be reviewed with you, unless that has been done over the phone prior to the procedure.     A nurse will insert an intravenous (IV) line into your hand or arm.    The doctor will meet with you and will give you a consent form to sign.    During the exam:     Medicine will be given through the IV line to help you relax.     Your heart rate and oxygen levels will be monitored. If your blood pressure is low, you may be given fluids through the IV line.     The doctor will insert a flexible hollow tube, called a colonoscope, into your rectum. The scope will be advanced slowly through the large intestine (colon).    You may have a feeling of fullness or pressure.     If an abnormal tissue or a polyp is found, the doctor may remove it through the endoscope for closer examination, or biopsy. Tissue removal is painless    After the exam:           Any tissue samples removed during the exam will be sent to a lab for evaluation. It may take 5-7 working days for you to be notified of the results.     A nurse will provide you with complete discharge instructions before you leave the endoscopy center. Be sure to ask the nurse for specific instructions if you take blood thinners such as Aspirin, Coumadin or Plavix.     The doctor will prepare a full report for  you and for the physician who referred you for the procedure.     Your doctor will talk with you about the initial results of your exam.      Medication given during the exam will prohibit you from driving for the rest of the day.     Following the exam, you may resume your normal diet. Your first meal should be light, no greasy foods. Avoid alcohol until the next day.     You may resume your regular activities the day after the procedure.     LOW-FIBER DIET    Foods RECOMMENDED Foods to AVOID   Breads, Cereal, Rice and Pasta:   White bread, rolls, biscuits, croissant and lilia toast.   Waffles, Pashto toast and pancakes.   White rice, noodles, pasta, macaroni and peeled cooked potatoes.   Plain crackers and saltines.   Cooked cereals: farina, cream of rice.   Cold cereals: Puffed Rice , Rice Krispies , Corn Flakes  and Special K    Breads, Cereal, Rice and Pasta:   Breads or rolls with nuts, seeds or fruit.   Whole wheat, pumpernickel, rye breads and cornbread.   Potatoes with skin, brown or wild rice, and kasha (buckwheat).     Vegetables:   Tender cooked and canned vegetables without seeds: carrots, asparagus tips, green or wax beans, pumpkin, spinach, lima beans. Vegetables:   Raw or steamed vegetables.   Vegetables with seeds.   Sauerkraut.   Winter squash, peas, broccoli, Brussel sprouts, cabbage, onions, cauliflower, baked beans, peas and corn.   Fruits:   Strained fruit juice.   Canned fruit, except pineapple.   Ripe bananas and melon. Fruits:   Prunes and prune juice.   Raw fruits.   Dried fruits: figs, dates and raisins.   Milk/Dairy:   Milk: plain or flavored.   Yogurt, custard and ice cream.   Cheese and cottage cheese Milk/Dairy:     Meat and other proteins:   ground, well-cooked tender beef, lamb, ham, veal, pork, fish, poultry and organ meats.   Eggs.   Peanut butter without nuts. Meat and other proteins:   Tough, fibrous meats with gristle.   Dry beans, peas and lentils.   Peanut butter with  nuts.   Tofu.   Fats, Snack, Sweets, Condiments and Beverages:   Margarine, butter, oils, mayonnaise, sour cream and salad dressing, plain gravy.   Sugar, hard candy, clear jelly, honey and syrup.   Spices, cooked herbs, bouillon, broth and soups made with allowed vegetable, ketchup and mustard.   Coffee, tea and carbonated drinks.   Plain cakes, cookies and pretzels.   Gelatin, plain puddings, custard, ice cream, sherbet and popsicles. Fats, Snack, Sweets, Condiments and Beverages:   Nuts, seeds and coconut.   Jam, marmalade and preserves.   Pickles, olives, relish and horseradish.   All desserts containing nuts, seeds, dried fruit and coconut; or made from whole grains or bran.   Candy made with nuts or seeds.   Popcorn.           DIRECTIONS TO THE ENDOSCOPY DEPARTMENT     From the north ([facility])  Take [address] South, exit on [address] [address]. Get into the left hand porfirio, turn left (east), go one-half mile to [address] and turn left. Go north to the first stoplight, take a right on [address] and follow it to the Emergency entrance.    From the south ([facility])  Take [address]N to the [address]E split and exit on [address] [address]. On [address] [address], turn left (west) to [address]. Turn right (north) on [address]. Go north to the first stoplight, take a right on [address] and follow it to the Emergency entrance.    From the east via 35E ([facility])  Take [address] south to [address] [address] exit. Turn right on [address] [address]. Go west to [address]. Turn right (north) on [address]. Go to the first stoplight, take a right and follow on [address] to the Emergency entrance.    From the east via [address] ([facility])  Take [address] to [address]. Turn left (south) on [address] to [address]. Turn left (east) on [address] and follow it to the Emergency entrance.    From the west via [address] ([place], [place])  Take [address]  east to [address]. Turn right (south) on [address] to [facility]. Turn left (east) on [email or web address] and follow it to the Emergency entrance.

## 2017-07-17 NOTE — H&P
Pre-Endoscopy History and Physical     Alize Holman MRN# [id number]   YOB: 1960 Age: 56 year old     Date of Procedure: 7/17/2017  Primary care provider: Dann Whitman  Type of Endoscopy: colonoscopy  Reason for Procedure: screening  Type of Anesthesia Anticipated: Conscious Sedation    HPI:    Alize is a 56 year old female who will be undergoing the above procedure.      A history and physical has been performed. The patient's medications and allergies have been reviewed. The risks and benefits of the procedure and the sedation options and risks were discussed with the patient.  All questions were answered and informed consent was obtained.      She denies a personal or family history of anesthesia complications or bleeding disorders.     Patient Active Problem List   Diagnosis     Advanced directives, counseling/discussion     Adult ADHD     Anxiety     Tobacco use disorder        History reviewed. No pertinent past medical history.     Past Surgical History:   Procedure Laterality Date     COLONOSCOPY  07/17/2017    Dr. Hernandez [facility]     GYN SURGERY      ectopic preg. surgery     HYSTERECTOMY, PAP NO LONGER INDICATED      abouit age 36     ORTHOPEDIC SURGERY      right knee torn meniscus     VASCULAR SURGERY      vein stripping       Relevant Family History: NONE    Relevant Social History: NONE     Prior to Admission medications    Medication Sig Start Date End Date Taking? Authorizing Provider   ibuprofen (ADVIL/MOTRIN) 200 MG tablet Take 200 mg by mouth 4/11/11  Yes Reported, Patient   trospium (SANCTURA) 20 MG tablet Take 20 mg by mouth 3/31/16  Yes Reported, Patient   escitalopram (LEXAPRO) 20 MG tablet Take 20 mg by mouth 10/18/16  Yes Reported, Patient   methylphenidate (RITALIN) 20 MG tablet Take 40 mg by mouth 1/23/17  Yes Reported, Patient   OMEPRAZOLE PO    Yes Reported, Patient   VITAMIN D, CHOLECALCIFEROL, PO Take by mouth daily   Yes Reported, Patient   vitamin B complex with  "vitamin C (VITAMIN  B COMPLEX) TABS tablet Take 1 tablet by mouth daily   Yes Reported, Patient   biotin 2.5 mg/mL SUSP Take by mouth daily   Yes Reported, Patient   Methylphenidate HCl (RITALIN PO)    Yes Reported, Patient   ESCITALOPRAM OXALATE PO    Yes Reported, Patient   fluticasone (FLONASE) 50 MCG/ACT spray INHALE 1 SPRAY IN EACH NOSTRIL EVERY DAY. 3/2/15   Reported, Patient       Allergies   Allergen Reactions     Penicillins      Sulfa Drugs         REVIEW OF SYSTEMS:   A relevant review of systems was performed and was negative    PHYSICAL EXAM:   BP (!) 121/92  Pulse 85  Resp 13  Ht 1.702 m (5' 7\")  Wt 68 kg (150 lb)  SpO2 94%  BMI 23.49 kg/m2 Estimated body mass index is 23.49 kg/(m^2) as calculated from the following:    Height as of this encounter: 1.702 m (5' 7\").    Weight as of this encounter: 68 kg (150 lb).   GENERAL APPEARANCE: alert, and oriented  MENTAL STATUS: alert  AIRWAY EXAM: Normal  RESP: lungs clear to auscultation - no rales, rhonchi or wheezes  CV: regular rates and rhythm  DIAGNOSTICS:    Not indicated    IMPRESSION   ASA Class 2 - Mild systemic disease    PLAN:   Plan for colonoscopy. We discussed the risks, benefits and alternatives and the patient wished to proceed.      Signed Electronically by: Yogesh Hightower  July 17, 2017            "

## 2017-07-18 LAB — COPATH REPORT: NORMAL

## 2017-10-11 ENCOUNTER — APPOINTMENT (OUTPATIENT)
Dept: VASCULAR SURGERY | Facility: CLINIC | Age: 57
End: 2017-10-11

## 2017-10-11 ENCOUNTER — OFFICE VISIT (OUTPATIENT)
Dept: VASCULAR SURGERY | Facility: CLINIC | Age: 57
End: 2017-10-11

## 2017-10-11 DIAGNOSIS — Z53.9 ERRONEOUS ENCOUNTER--DISREGARD: Primary | ICD-10-CM

## 2017-10-11 PROCEDURE — 99207 ZZC VEINSOLUTIONS NO CHARGE VISIT: CPT | Performed by: SURGERY

## 2017-10-11 PROCEDURE — 37765 STAB PHLEB VEINS XTR 10-20: CPT | Mod: RT | Performed by: SURGERY

## 2017-10-11 PROCEDURE — 37766 PHLEB VEINS - EXTREM 20+: CPT | Mod: LT | Performed by: SURGERY

## 2017-10-11 PROCEDURE — 37799 UNLISTED PX VASCULAR SURGERY: CPT | Performed by: SURGERY

## 2017-10-11 PROCEDURE — S9999 SALES TAX: HCPCS | Performed by: SURGERY

## 2017-10-11 NOTE — PROGRESS NOTES
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  Assistant was required owing to challenging exposure and need for retraction.    INDICATIONS:  57-year-old patient has had previous open bilateral saphenous vein stripping and stab phlebectomy with initial good results.  Over the years she is developed extensive varicosities starting in the proximal thigh area bilaterally extending down to the ankles more anteriorly but also posterior medially that causes her pain with even short episodes of standing.  Due to the extent thigh-high compression stockings have not been helpful  She has not had complications such as phlebitis or bleeding or ulcerations.  Preprocedure duplex confirmed a normal deep system with removal of both greater saphenous veins. The origin appears to be from a side branch going into the saphenous junction in the groin area.  We felt that she would benefit from extensive bilateral stab phlebectomy. Risks and benefits were reviewed including her postop compression protocol and return to normal activities with no  Further questions by the patient.    PROCEDURE: With the patient standing the surface varicosities in this very thin patient were marked from the groin to the ankles.  She was then brought for procedure room where both legs were prepped and draped with timeout being called and the sites identified.    Due to the marked number of her varicosities we had two of our venous  CSTs scrub into shortening operative procedure.    Both legs were worked on simultaneously. Tumescent solution was injected with good results.   A micro-ophthalmic blade was used to make  Stab incisions over the previously marked sites..  Pain hooks were used to remove the marked varicosities in the right entirety.  This included the ankle regions.  Several large feeding branches particularly in the right calf and ankle were ligated with 3-0 Vicryl sutures for better hemostasis.      Following completion of the procedure which lasted one hour and 45 minutesVaseline ointment was applied to all sites followed by ABD pads,cast rolls,,Ace bandages from the toes to the groin.    Patient tolerated the procedure quite well.  Estimated blood loss less than 20 mL.  We used a total of 560 mL of our tumescent injection solution.    On the right leg there were 51 stab sites.  On the left leg there were 67 stab sites.    Continuous blood pressure-pulse oximeter-pulse were monitored by JOSH Azar RN under my direct supervision.  Patient was very comfortable during the entire procedure..  She recovered in our sweets and was discharged to home with her  with postoperative instructions and follow-up plans.      Roosevelt Jerry MD   Dictated 10/11/2017 at 1043 hrs.    ESTIMATED BLOOD LOSS:  * No surgery found *    INTRAOPERATIVE FINDINGS:      Roosevelt Jerry

## 2017-10-11 NOTE — MR AVS SNAPSHOT
"              After Visit Summary   10/11/2017    Alize Holman    MRN: [id number]           Patient Information     Date Of Birth          1960        Visit Information        Provider Department      10/11/2017 9:00 AM Roosevelt Jerry MD;  VEIN PROCED[facility]OM 1 [facility]      Today's Diagnoses     ERRONEOUS ENCOUNTER--DISREGARD    -  1       Follow-ups after your visit        Who to contact     If you have questions or need follow up information about today's clinic visit or your schedule please contact [facility] directly at [phone].  Normal or non-critical lab and imaging results will be communicated to you by vocaltaphart, letter or phone within 4 business days after the clinic has received the results. If you do not hear from us within 7 days, please contact the clinic through vocaltaphart or phone. If you have a critical or abnormal lab result, we will notify you by phone as soon as possible.  Submit refill requests through [employer] or call your pharmacy and they will forward the refill request to us. Please allow 3 business days for your refill to be completed.          Additional Information About Your Visit        MyChart Information     [employer] lets you send messages to your doctor, view your test results, renew your prescriptions, schedule appointments and more. To sign up, go to [email or web address] . Click on \"Log in\" on the left side of the screen, which will take you to the Welcome page. Then click on \"Sign up Now\" on the right side of the page.     You will be asked to enter the access code listed below, as well as some personal information. Please follow the directions to create your username and password.     Your access code is: [email or web address]  Expires: 2018  2:39 PM     Your access code will  in 90 days. If you need help or a new code, please call your [facility] clinic or [phone].      "   Care EveryWhere ID     This is your Care EveryWhere ID. This could be used by other organizations to access your [place] medical records  [id number]         Blood Pressure from Last 3 Encounters:   07/17/17 (!) 126/92   04/20/17 116/86   04/14/17 111/74    Weight from Last 3 Encounters:   07/17/17 150 lb (68 kg)   04/20/17 147 lb (66.7 kg)   04/13/17 155 lb (70.3 kg)              Today, you had the following     No orders found for display       Primary Care Provider Office Phone # Fax #    Dann Whitman [phone]       [facility] [id number] [place] [address]        Equal Access to Services     KACI WELLS : Armida nairo Lindsay, waaxda luqadaha, qaybta kaalmada adeegyada, fatou trinidad. So [facility] [phone].    ATENCIÓN: Si habla español, tiene a espinoza disposición servicios gratuitos de asistencia lingüística. [facility] [phone].    We comply with applicable federal civil rights laws and [place] laws. We do not discriminate on the basis of race, color, national origin, age, disability, sex, sexual orientation, or gender identity.            Thank you!     Thank you for choosing [facility]  for your care. Our goal is always to provide you with excellent care. Hearing back from our patients is one way we can continue to improve our services. Please take a few minutes to complete the written survey that you may receive in the mail after your visit with us. Thank you!             Your Updated Medication List - Protect others around you: Learn how to safely use, store and throw away your medicines at www.disposemymeds.org.          This list is accurate as of 10/11/17 11:59 PM.  Always use your most recent med list.                   Brand Name Dispense Instructions for use Diagnosis    biotin 2.5 mg/mL Susp      Take by mouth daily        * ESCITALOPRAM OXALATE PO           * escitalopram 20 MG tablet     LEXAPRO     Take 20 mg by mouth        fluticasone 50 MCG/ACT spray    FLONASE     INHALE 1 SPRAY IN EACH NOSTRIL EVERY DAY.        ibuprofen 200 MG tablet    ADVIL/MOTRIN     Take 200 mg by mouth        OMEPRAZOLE PO           * RITALIN PO           * methylphenidate 20 MG tablet    RITALIN     Take 40 mg by mouth        trospium 20 MG tablet    SANCTURA     Take 20 mg by mouth        vitamin B complex with vitamin C Tabs tablet      Take 1 tablet by mouth daily        VITAMIN D (CHOLECALCIFEROL) PO      Take by mouth daily        * Notice:  This list has 4 medication(s) that are the same as other medications prescribed for you. Read the directions carefully, and ask your doctor or other care provider to review them with you.

## 2017-10-13 ENCOUNTER — APPOINTMENT (OUTPATIENT)
Dept: VASCULAR SURGERY | Facility: CLINIC | Age: 57
End: 2017-10-13
Payer: COMMERCIAL

## 2017-10-13 PROCEDURE — 99207 ZZC VEINSOLUTIONS POST OPERATIVE VISIT: CPT | Performed by: SURGERY

## 2024-06-17 PROBLEM — Z71.89 ADVANCED DIRECTIVES, COUNSELING/DISCUSSION: Status: RESOLVED | Noted: 2017-04-20 | Resolved: 2024-06-17

## (undated) DEVICE — ENDO SNARE POLYPECTOMY OVAL 15MM LOOP SD-240U-15

## (undated) DEVICE — KIT ENDO TURNOVER/PROCEDURE W/CLEAN A SCOPE LINERS 103888

## (undated) RX ORDER — FENTANYL CITRATE 50 UG/ML
INJECTION, SOLUTION INTRAMUSCULAR; INTRAVENOUS
Status: DISPENSED
Start: 2017-07-17